# Patient Record
Sex: FEMALE | Race: WHITE | NOT HISPANIC OR LATINO
[De-identification: names, ages, dates, MRNs, and addresses within clinical notes are randomized per-mention and may not be internally consistent; named-entity substitution may affect disease eponyms.]

---

## 2020-02-05 DIAGNOSIS — Z87.442 PERSONAL HISTORY OF URINARY CALCULI: ICD-10-CM

## 2020-02-07 RX ORDER — POTASSIUM CITRATE 10 MEQ/1
10 MEQ TABLET, EXTENDED RELEASE ORAL
Qty: 60 | Refills: 2 | Status: ACTIVE | COMMUNITY
Start: 2020-02-05 | End: 1900-01-01

## 2020-03-05 ENCOUNTER — APPOINTMENT (OUTPATIENT)
Dept: UROLOGY | Facility: CLINIC | Age: 50
End: 2020-03-05
Payer: COMMERCIAL

## 2020-03-05 VITALS
HEIGHT: 62.2 IN | DIASTOLIC BLOOD PRESSURE: 68 MMHG | BODY MASS INDEX: 21.23 KG/M2 | WEIGHT: 116.84 LBS | SYSTOLIC BLOOD PRESSURE: 107 MMHG | HEART RATE: 69 BPM | TEMPERATURE: 99.6 F

## 2020-03-05 DIAGNOSIS — Z00.00 ENCOUNTER FOR GENERAL ADULT MEDICAL EXAMINATION W/OUT ABNORMAL FINDINGS: ICD-10-CM

## 2020-03-05 LAB
BILIRUB UR QL STRIP: NORMAL
CLARITY UR: CLEAR
COLLECTION METHOD: NORMAL
GLUCOSE UR-MCNC: NORMAL
HCG UR QL: 1 EU/DL
HGB UR QL STRIP.AUTO: NORMAL
KETONES UR-MCNC: NORMAL
LEUKOCYTE ESTERASE UR QL STRIP: NORMAL
NITRITE UR QL STRIP: NORMAL
PH UR STRIP: 7
PROT UR STRIP-MCNC: NORMAL
SP GR UR STRIP: 1.02

## 2020-03-05 PROCEDURE — 81003 URINALYSIS AUTO W/O SCOPE: CPT | Mod: QW

## 2020-03-05 PROCEDURE — 99215 OFFICE O/P EST HI 40 MIN: CPT

## 2020-03-08 NOTE — HISTORY OF PRESENT ILLNESS
[FreeTextEntry1] : Patient is a 49 year old female with history of kidney stones, on pot citrate, (see prior chart).  She underwent a repeat 24 hour urine collection last month and the hypercalcemia has improved slightly, (down to 225 from 307).   She is feeling well with no stone symptoms.   It has been approximately 1 year since her last imaging.  \par \par Of note, she has experienced slight stress urinary incontinence lately.  She is perimenopausal.

## 2020-03-08 NOTE — ASSESSMENT
[FreeTextEntry1] : This is a 49 year old female with history of kidney stones.  I have sent her for repeat imaging and will be in touch with her when I have the results.

## 2020-03-08 NOTE — LETTER BODY
[Dear  ___] : Dear  [unfilled], [Consult Letter:] : I had the pleasure of evaluating your patient, [unfilled]. [Please see my note below.] : Please see my note below. [Consult Closing:] : Thank you very much for allowing me to participate in the care of this patient.  If you have any questions, please do not hesitate to contact me. [Sincerely,] : Sincerely, [FreeTextEntry3] : Dr. Yamile Garcia\par

## 2020-03-11 LAB — URINE CYTOLOGY: NORMAL

## 2020-09-25 ENCOUNTER — RX RENEWAL (OUTPATIENT)
Age: 50
End: 2020-09-25

## 2021-10-21 ENCOUNTER — APPOINTMENT (OUTPATIENT)
Dept: UROLOGY | Facility: CLINIC | Age: 51
End: 2021-10-21
Payer: COMMERCIAL

## 2021-10-21 PROCEDURE — 99443: CPT

## 2021-10-27 NOTE — HISTORY OF PRESENT ILLNESS
[Other Location: e.g. School (Enter Location, City,State)___] : at [unfilled], at the time of the visit. [FreeTextEntry1] : Patient is a 51 year old female with history of kidney stones, on pot citrate, (see prior chart).  Pt presented today her telehealth visit to discuss the results of her CT scan. \par \par Full Hx; 3/5/2020-- She underwent a repeat 24 hour urine collection last month and the hypercalcemia has improved slightly, (down to 225 from 307).   She is feeling well with no stone symptoms.   It has been approximately 1 year since her last imaging.  \par \par Of note, she has experienced slight stress urinary incontinence lately.  She is perimenopausal.

## 2021-10-27 NOTE — ADDENDUM
[FreeTextEntry1] : A portion of this note was written by [Karine Lianez] on 10/21/2021 acting as a scribe for Dr. Garcia. \par \par I have personally reviewed the chart and agree that the record accurately reflects my personal performance of the history, physical exam, assessment, and plan.

## 2021-10-27 NOTE — ASSESSMENT
[FreeTextEntry1] : This is a 49 year old female with history of kidney stones who presented today for a telehealth visit to discuss her recent imaging. Her CT scan showed evidence of 2 small calculus in the R kidney, stable in size.  On her last imaging, there were 3 stones, so presumably one has passed. \par  \par I suggested she continue to take potassium citrate, drink plenty of water, and we reviewed dietary modifications.  Pt will undergo an annual renal US for surveillance, and f/u with me if she develops stone related symptoms. \par \par Pt expressed understanding.

## 2022-01-03 ENCOUNTER — RX RENEWAL (OUTPATIENT)
Age: 52
End: 2022-01-03

## 2022-01-03 RX ORDER — POTASSIUM CITRATE 10 MEQ/1
10 MEQ TABLET, EXTENDED RELEASE ORAL
Qty: 60 | Refills: 11 | Status: ACTIVE | COMMUNITY
Start: 2020-10-13 | End: 1900-01-01